# Patient Record
(demographics unavailable — no encounter records)

---

## 2025-01-06 NOTE — ASSESSMENT
[FreeTextEntry1] : Prior to mapping, testing was performed with patient's right N7, P1. FM tones obtained from 35-30 dBHL from 500 - 6000 Hz.  EQUIPMENT Right CI : Cochlear Internal:  Implant date: 20 Surgeon: Dr. Cintron Processor type(s): [id number] Serial numbers: [id number] Magnet strength/color: x3(I), gray [employer].5 hrs/day  MAPPING Right CI Impedances: WNL all electrodes Disabled electrodes: e1 and e2 (stable) P1: #40- New Ts via hand raising (-5 CUs from measured), Cs in live speech, swept Cs for comfort Volume/sensitivity:  Processing strategy: ACE + SCAN Parameters: 900 Hz, 50 PW, 8 maxima  Patient reported comfort with new P1.   Lowered magnet strength from x4(I) to x3(I)- felt appropriate. Changed N7 connor cover. RMA'd 2 coils, will be sent to patient directly (current coils are stiff).   Patient inquired regarding upgrade. Patient's N7 warranty expires in 2025- advised patient to contact Cochlear at that time to start upgrade process.

## 2025-01-06 NOTE — PHYSICAL EXAM
[FreeTextEntry6] : CI incision clean [FreeTextEntry8] : cerumen removed - Tm normal  [FreeTextEntry9] : partial stenosis - copious cerumen removed - no evidence of infection [de-identified] : retracted but normal  [Normal] : mucosa is normal [Midline] : trachea located in midline position

## 2025-01-06 NOTE — REASON FOR VISIT
[Patient Declined  Services] : - None: Patient declined  services [FreeTextEntry3] : Prefers daughter to translate  [TWNoteComboBox1] : Dipika

## 2025-01-06 NOTE — HISTORY OF PRESENT ILLNESS
[de-identified] : 70 y/o M presents for 6 month follow up. 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 Reports doing well. Hearing overall stable. Denies otalgia, otorrhea, ear infections, dizziness, vertigo.

## 2025-01-06 NOTE — PHYSICAL EXAM
[FreeTextEntry6] : CI incision clean [FreeTextEntry8] : cerumen removed - Tm normal  [FreeTextEntry9] : partial stenosis - copious cerumen removed - no evidence of infection [de-identified] : retracted but normal  [Normal] : mucosa is normal [Midline] : trachea located in midline position

## 2025-01-06 NOTE — PLAN
[FreeTextEntry2] : 1. Full time use of CI.  2. F/up annually, sooner if needed. 3. Continued otologic management.

## 2025-01-06 NOTE — HISTORY OF PRESENT ILLNESS
[de-identified] : 68 y/o M presents for 6 month follow up. 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 Reports doing well. Hearing overall stable. Denies otalgia, otorrhea, ear infections, dizziness, vertigo.

## 2025-01-06 NOTE — HISTORY OF PRESENT ILLNESS
[FreeTextEntry1] : Mr. Cruz has a known long-standing hearing loss bilaterally. He reported having a hearing loss since childhood, which progressed over recent years. Mr. Cruz used traditional hearing aids since approximately 2004. Medical history is significant for dizziness and multiple ear surgeries. Mr. Cruz received a cochlear implant for his right ear in August 2020. He does not use technology for his left ear.  [FreeTextEntry8] : Seen today for routine f/up right CI, accompanied by daughter. Reports doing well overall with CI. Using N7, P1.

## 2025-07-05 NOTE — REASON FOR VISIT
[Subsequent Evaluation] : a subsequent evaluation for [Patient Declined  Services] : - None: Patient declined  services [FreeTextEntry2] : hearing loss  [FreeTextEntry3] : Prefers daughter to translate  [TWNoteComboBox1] : Dipika

## 2025-07-05 NOTE — HISTORY OF PRESENT ILLNESS
[de-identified] : 71 y/o M presents for 6 month follow up. 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 Reports hearing stable, daughter concerns with no improvements to hearing. Denies otalgia, otorrhea, dizziness/vertigo, ear infections

## 2025-07-05 NOTE — PHYSICAL EXAM
[Midline] : trachea located in midline position [Normal] : orientation to person, place, and time: normal [FreeTextEntry6] : CI incision clean [FreeTextEntry8] : cerumen removed - Tm normal  [FreeTextEntry9] : partial stenosis - copious cerumen removed - no evidence of infection [de-identified] : retracted but normal  [de-identified] : CN VII normal

## 2025-07-05 NOTE — PHYSICAL EXAM
[Midline] : trachea located in midline position [Normal] : orientation to person, place, and time: normal [FreeTextEntry6] : CI incision clean [FreeTextEntry8] : cerumen removed - Tm normal  [FreeTextEntry9] : partial stenosis - copious cerumen removed - no evidence of infection [de-identified] : retracted but normal  [de-identified] : CN VII normal

## 2025-07-05 NOTE — HISTORY OF PRESENT ILLNESS
[de-identified] : 71 y/o M presents for 6 month follow up. 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 Reports hearing stable, daughter concerns with no improvements to hearing. Denies otalgia, otorrhea, dizziness/vertigo, ear infections